# Patient Record
Sex: FEMALE | Race: WHITE | NOT HISPANIC OR LATINO | Employment: OTHER | URBAN - METROPOLITAN AREA
[De-identification: names, ages, dates, MRNs, and addresses within clinical notes are randomized per-mention and may not be internally consistent; named-entity substitution may affect disease eponyms.]

---

## 2023-11-20 DIAGNOSIS — I10 ESSENTIAL HYPERTENSION, BENIGN: ICD-10-CM

## 2023-12-05 PROBLEM — I25.10 CAD IN NATIVE ARTERY: Status: ACTIVE | Noted: 2023-12-05

## 2023-12-05 PROBLEM — R00.2 PALPITATIONS: Status: ACTIVE | Noted: 2023-12-05

## 2023-12-05 PROBLEM — G47.30 SLEEP APNEA: Status: ACTIVE | Noted: 2023-12-05

## 2023-12-05 PROBLEM — E78.2 MIXED HYPERLIPIDEMIA: Status: ACTIVE | Noted: 2023-12-05

## 2023-12-05 PROBLEM — E11.9 TYPE 2 DIABETES MELLITUS (MULTI): Status: ACTIVE | Noted: 2023-12-05

## 2023-12-05 PROBLEM — E87.6 HYPOKALEMIA: Status: ACTIVE | Noted: 2023-12-05

## 2023-12-05 PROBLEM — R09.89 BRUIT: Status: ACTIVE | Noted: 2023-12-05

## 2023-12-05 PROBLEM — I10 ESSENTIAL HYPERTENSION, BENIGN: Status: ACTIVE | Noted: 2023-12-05

## 2023-12-05 RX ORDER — LEVOTHYROXINE SODIUM 25 UG/1
TABLET ORAL
COMMUNITY

## 2023-12-05 RX ORDER — PSYLLIUM HUSK 0.4 G
CAPSULE ORAL
COMMUNITY

## 2023-12-05 RX ORDER — ATORVASTATIN CALCIUM 40 MG/1
40 TABLET, FILM COATED ORAL DAILY
COMMUNITY
End: 2024-03-15 | Stop reason: SDUPTHER

## 2023-12-05 RX ORDER — LANCETS 30 GAUGE
EACH MISCELLANEOUS
COMMUNITY
Start: 2023-08-25

## 2023-12-05 RX ORDER — GLIMEPIRIDE 1 MG/1
0.5 TABLET ORAL
COMMUNITY

## 2023-12-05 RX ORDER — BLOOD-GLUCOSE METER
EACH MISCELLANEOUS
COMMUNITY
Start: 2023-08-25

## 2023-12-05 RX ORDER — LISINOPRIL AND HYDROCHLOROTHIAZIDE 10; 12.5 MG/1; MG/1
TABLET ORAL
COMMUNITY
End: 2024-03-15 | Stop reason: WASHOUT

## 2023-12-05 RX ORDER — NAPROXEN SODIUM 220 MG/1
81 TABLET, FILM COATED ORAL DAILY
COMMUNITY

## 2023-12-05 RX ORDER — NITROGLYCERIN 0.4 MG/1
0.4 TABLET SUBLINGUAL EVERY 5 MIN PRN
COMMUNITY

## 2023-12-05 RX ORDER — MELOXICAM 7.5 MG/1
TABLET ORAL
COMMUNITY

## 2023-12-05 RX ORDER — LANCETS 33 GAUGE
EACH MISCELLANEOUS
COMMUNITY
Start: 2023-08-25

## 2023-12-06 RX ORDER — LISINOPRIL AND HYDROCHLOROTHIAZIDE 10; 12.5 MG/1; MG/1
TABLET ORAL
Qty: 135 TABLET | Refills: 3 | Status: SHIPPED | OUTPATIENT
Start: 2023-12-06

## 2024-03-15 ENCOUNTER — OFFICE VISIT (OUTPATIENT)
Dept: CARDIOLOGY | Facility: CLINIC | Age: 89
End: 2024-03-15
Payer: MEDICARE

## 2024-03-15 VITALS
BODY MASS INDEX: 24.4 KG/M2 | WEIGHT: 124.3 LBS | HEIGHT: 60 IN | SYSTOLIC BLOOD PRESSURE: 116 MMHG | HEART RATE: 88 BPM | DIASTOLIC BLOOD PRESSURE: 72 MMHG

## 2024-03-15 DIAGNOSIS — I10 ESSENTIAL HYPERTENSION, BENIGN: ICD-10-CM

## 2024-03-15 DIAGNOSIS — R00.2 PALPITATIONS: ICD-10-CM

## 2024-03-15 DIAGNOSIS — Z78.9 NEVER SMOKED TOBACCO: ICD-10-CM

## 2024-03-15 DIAGNOSIS — I25.10 CAD IN NATIVE ARTERY: ICD-10-CM

## 2024-03-15 DIAGNOSIS — E11.9 TYPE 2 DIABETES MELLITUS WITHOUT COMPLICATION, WITHOUT LONG-TERM CURRENT USE OF INSULIN (MULTI): ICD-10-CM

## 2024-03-15 DIAGNOSIS — E78.2 MIXED HYPERLIPIDEMIA: ICD-10-CM

## 2024-03-15 PROCEDURE — 1159F MED LIST DOCD IN RCRD: CPT | Performed by: INTERNAL MEDICINE

## 2024-03-15 PROCEDURE — 1036F TOBACCO NON-USER: CPT | Performed by: INTERNAL MEDICINE

## 2024-03-15 PROCEDURE — 3078F DIAST BP <80 MM HG: CPT | Performed by: INTERNAL MEDICINE

## 2024-03-15 PROCEDURE — 99213 OFFICE O/P EST LOW 20 MIN: CPT | Performed by: INTERNAL MEDICINE

## 2024-03-15 PROCEDURE — 3074F SYST BP LT 130 MM HG: CPT | Performed by: INTERNAL MEDICINE

## 2024-03-15 RX ORDER — ATORVASTATIN CALCIUM 40 MG/1
40 TABLET, FILM COATED ORAL DAILY
Qty: 90 TABLET | Refills: 3 | Status: SHIPPED | OUTPATIENT
Start: 2024-03-15

## 2024-03-15 NOTE — PATIENT INSTRUCTIONS
Follow up office visit in 1 year.    Continue same medications/treatment.  Patient educated on proper medication use.  Patient educated on risk factor modification.  Please bring any lab results from other providers / physicians to your next appointment.    Please bring all medicines, vitamins and herbal supplements with you when you come to the office.    Prescriptions will not be filled unless you are compliant with your follow up appointments or have a follow up  appointment scheduled as per instruction of your physician.  Refills should be requested at the time of  Your visit.    Joyce AGUILERA LPN, am scribing for and in the presence of  Dr. Delphine Preciado MD, FACC

## 2024-03-15 NOTE — PROGRESS NOTES
Referred by Dr. Claudio ref. provider found provider found for   Chief Complaint   Patient presents with    Follow-up     9 month follow up        History of Present Illness  Michelle Sánchez is a 89 y.o. year old female patient with history of hypertension, doing well from a cardiac standpoint no complaint no symptoms of chest pain or shortness of breath.  Has been taking her medication with no difficulty.  Apparently scheduled for blood work by her primary care physician at the [facility].  She has been ambulating with no difficulty.  She is extremely oriented and independent and very functional for her age.  I discussed with the patient in length we will continue medication.  Her main issue at this point is back problem and back pain because of her chronic back arthritis.  Patient will continue medication and mild monitor blood pressure and report back to me.  She will see me back in 1 year    Past Medical History  No past medical history on file.    Social History  Social History     Tobacco Use    Smoking status: Never    Smokeless tobacco: Never   Substance Use Topics    Alcohol use: Yes     Comment: 2-3x a year    Drug use: Never       Family History     Family History   Problem Relation Name Age of Onset    Coronary artery disease Mother      Alcohol abuse Father         Review of Systems  As per HPI, all other systems reviewed and negative.    Allergies:  No Known Allergies     Outpatient Medications:  Current Outpatient Medications   Medication Instructions    aspirin 81 mg, oral, Daily    atorvastatin (LIPITOR) 40 mg, oral, Daily, as directed    calcium carbonate/vitamin D3 (CALCIUM 500 + D ORAL) 1 tablet, oral, Daily    glimepiride (Amaryl) 1 mg tablet 0.5 tablets, oral, Daily before breakfast    levothyroxine (Synthroid, Levoxyl) 25 mcg tablet TAKE 1 TABLET BY MOUTH DAILY MONDAY-FRIDAY AND 1 AND 1/2 TABLET SATURDAY AND SUNDAY    lisinopriL-hydrochlorothiazide 10-12.5 mg tablet TAKE 1 TABLET IN THE AM.  IF BP IS LOW IN THE AFTERNOON DO NOT TAKE THE SECOND DOSAGE    meloxicam (Mobic) 7.5 mg tablet TAKE 1 TABLET ONCE DAILY AS NEEDED DO NOT TAKE WITH OTHER NSAIDS (MOTRIN, ADVIL, ALEVE, IBUPROFEN)    nitroglycerin (NITROSTAT) 0.4 mg, sublingual, Every 5 min PRN    OneTouch Delica Plus Lancet 30 gauge misc USE ONE LANCET TWICE WEEKLY    OneTouch Verio Flex meter misc USE AS DIRECTED    OneTouch Verio test strips strip TEST TWICE A WEEK    psyllium (MetamuciL) 0.4 gram capsule oral         Vitals:  Vitals:    03/15/24 1256   BP: 116/72   Pulse: 88       Physical Exam:  Physical Exam  Vitals and nursing note reviewed.   Constitutional:       Appearance: Normal appearance.   HENT:      Head: Normocephalic.   Eyes:      Pupils: Pupils are equal, round, and reactive to light.   Cardiovascular:      Rate and Rhythm: Normal rate and regular rhythm.      Pulses: Normal pulses.      Heart sounds: Normal heart sounds.   Pulmonary:      Effort: Pulmonary effort is normal.      Breath sounds: Normal breath sounds.   Musculoskeletal:         General: Normal range of motion.      Cervical back: Normal range of motion.   Skin:     General: Skin is dry.   Neurological:      General: No focal deficit present.      Mental Status: She is alert and oriented to person, place, and time.   Psychiatric:         Behavior: Behavior is cooperative.             Assessment/Plan   Diagnoses and all orders for this visit:  CAD in native artery  Essential hypertension, benign  Mixed hyperlipidemia  Palpitations  Type 2 diabetes mellitus without complication, without long-term current use of insulin (CMS/[facility])  BMI 24.0-24.9, adult  Never smoked tobacco          Delphine Preciado MD [facility]  Interventional Cardiology   of [facility]     Thank you for allowing me to participate in the care of this patient. Please do not hesitate to contact me with any further questions or concerns.

## 2025-01-28 DIAGNOSIS — I10 ESSENTIAL HYPERTENSION, BENIGN: ICD-10-CM

## 2025-01-28 RX ORDER — LISINOPRIL AND HYDROCHLOROTHIAZIDE 10; 12.5 MG/1; MG/1
TABLET ORAL
Qty: 135 TABLET | Refills: 1 | Status: SHIPPED | OUTPATIENT
Start: 2025-01-28

## 2025-04-08 ENCOUNTER — APPOINTMENT (OUTPATIENT)
Dept: CARDIOLOGY | Facility: CLINIC | Age: OVER 89
End: 2025-04-08
Payer: MEDICARE

## 2025-04-08 VITALS
WEIGHT: 127.1 LBS | DIASTOLIC BLOOD PRESSURE: 74 MMHG | SYSTOLIC BLOOD PRESSURE: 130 MMHG | BODY MASS INDEX: 25.62 KG/M2 | HEIGHT: 59 IN | HEART RATE: 64 BPM

## 2025-04-08 DIAGNOSIS — R00.2 PALPITATIONS: ICD-10-CM

## 2025-04-08 DIAGNOSIS — I25.10 CAD IN NATIVE ARTERY: ICD-10-CM

## 2025-04-08 DIAGNOSIS — Z78.9 NEVER SMOKED TOBACCO: ICD-10-CM

## 2025-04-08 DIAGNOSIS — E78.2 MIXED HYPERLIPIDEMIA: ICD-10-CM

## 2025-04-08 DIAGNOSIS — E11.9 TYPE 2 DIABETES MELLITUS WITHOUT COMPLICATION, WITHOUT LONG-TERM CURRENT USE OF INSULIN: ICD-10-CM

## 2025-04-08 DIAGNOSIS — I10 ESSENTIAL HYPERTENSION, BENIGN: ICD-10-CM

## 2025-04-08 PROCEDURE — 99214 OFFICE O/P EST MOD 30 MIN: CPT | Performed by: INTERNAL MEDICINE

## 2025-04-08 PROCEDURE — 1159F MED LIST DOCD IN RCRD: CPT | Performed by: INTERNAL MEDICINE

## 2025-04-08 PROCEDURE — 1036F TOBACCO NON-USER: CPT | Performed by: INTERNAL MEDICINE

## 2025-04-08 PROCEDURE — 3078F DIAST BP <80 MM HG: CPT | Performed by: INTERNAL MEDICINE

## 2025-04-08 PROCEDURE — 3075F SYST BP GE 130 - 139MM HG: CPT | Performed by: INTERNAL MEDICINE

## 2025-04-08 RX ORDER — LISINOPRIL AND HYDROCHLOROTHIAZIDE 10; 12.5 MG/1; MG/1
1 TABLET ORAL DAILY
Start: 2025-04-08 | End: 2026-04-08

## 2025-04-08 RX ORDER — GABAPENTIN 300 MG/1
300 CAPSULE ORAL 3 TIMES DAILY
COMMUNITY

## 2025-04-08 NOTE — PROGRESS NOTES
Referred by Dr. Claudio ref. provider found provider found for   Chief Complaint   Patient presents with    Follow-up     Patient here for 1 year surveillance follow up for ongoing management of Coronary Artery Disease and Hypertension.         Chief complaint:   Chief Complaint   Patient presents with    Follow-up     Patient here for 1 year surveillance follow up for ongoing management of Coronary Artery Disease and Hypertension.         History of Present Illness  Michelle Sánchez is a 89 y.o. year old female patient for follow-up.  Blood pressure is adequately controlled.  She has been taking her antihypertensive medication with no difficulties.  Denies any symptoms of palpitations syncope or presyncope.  I clarified lisinopril hydrochlorothiazide order to be twice a day.  Did not have any issue with medication.  Discussed with the patient we will continue medication will call for any problem and follow-up as scheduled    Past Medical History  Past Medical History:   Diagnosis Date    Coronary artery disease     Diabetes mellitus (Multi)     Hyperlipidemia     Hypertension        Social History  Social History     Tobacco Use    Smoking status: Never    Smokeless tobacco: Never   Substance Use Topics    Alcohol use: Not Currently     Comment: 2-3x a year    Drug use: Never       Family History     Family History   Problem Relation Name Age of Onset    Coronary artery disease Mother      Alcohol abuse Father         Review of Systems  As per HPI, all other systems reviewed and negative.    Allergies:  No Known Allergies     Outpatient Medications:  Current Outpatient Medications   Medication Instructions    aspirin 81 mg, Daily    atorvastatin (LIPITOR) 40 mg, oral, Daily, as directed    calcium carbonate/vitamin D3 (CALCIUM 500 + D ORAL) 1 tablet, Daily    gabapentin (NEURONTIN) 300 mg, 3 times daily    glimepiride (Amaryl) 1 mg tablet 0.5 tablets, Daily before breakfast    levothyroxine (Synthroid, Levoxyl) 25 mcg  tablet TAKE 1 TABLET BY MOUTH DAILY MONDAY-FRIDAY AND 1 AND 1/2 TABLET SATURDAY AND SUNDAY    lisinopriL-hydrochlorothiazide 10-12.5 mg tablet 1 tablet, oral, 2 times daily, If SBP is below 140 in evening then hold evening dose.    meloxicam (Mobic) 7.5 mg tablet TAKE 1 TABLET ONCE DAILY AS NEEDED DO NOT TAKE WITH OTHER NSAIDS (MOTRIN, ADVIL, ALEVE, IBUPROFEN)    nitroglycerin (NITROSTAT) 0.4 mg, Every 5 min PRN    OneTouch Delica Plus Lancet 30 gauge misc USE ONE LANCET TWICE WEEKLY    OneTouch Verio Flex meter misc USE AS DIRECTED    OneTouch Verio test strips strip TEST TWICE A WEEK    psyllium (MetamuciL) 0.4 gram capsule Take by mouth.         Vitals:  Vitals:    04/08/25 1159   BP: 130/74   Pulse: 64       Physical Exam:  Physical Exam  Vitals and nursing note reviewed.   Constitutional:       Appearance: Normal appearance.   HENT:      Head: Normocephalic.   Eyes:      Pupils: Pupils are equal, round, and reactive to light.   Cardiovascular:      Rate and Rhythm: Normal rate and regular rhythm.      Pulses: Normal pulses.      Heart sounds: Normal heart sounds.   Pulmonary:      Effort: Pulmonary effort is normal.      Breath sounds: Normal breath sounds.   Musculoskeletal:         General: Normal range of motion.      Cervical back: Normal range of motion.   Skin:     General: Skin is dry.   Neurological:      General: No focal deficit present.      Mental Status: She is alert and oriented to person, place, and time.   Psychiatric:         Behavior: Behavior is cooperative.             Assessment/Plan   Diagnoses and all orders for this visit:  Essential hypertension, benign  CAD in native artery  Mixed hyperlipidemia  Palpitations  Type 2 diabetes mellitus without complication, without long-term current use of insulin  BMI 26.0-26.9,adult  Never smoked tobacco      Joyce AGUILERA LPN am scribing for, and in the presence of Delphine Preciado M.D..    Delphine AGUILERA M.D., personally performed the services  described in the documentation as scribed by Joyce Ma LPN   in my presence, and confirm it is both accurate and complete.      Delphine Prceiado MD [facility]  Interventional Cardiology  Thank you for allowing me to participate in the care of this patient. Please do not hesitate to contact me with any further questions or concerns.      Unable to assess due to patient's cognitive impairment

## 2025-06-04 DIAGNOSIS — E78.2 MIXED HYPERLIPIDEMIA: ICD-10-CM

## 2025-06-04 RX ORDER — ATORVASTATIN CALCIUM 40 MG/1
40 TABLET, FILM COATED ORAL DAILY
Qty: 90 TABLET | Refills: 3 | Status: SHIPPED | OUTPATIENT
Start: 2025-06-04 | End: 2026-06-04

## 2025-06-04 NOTE — TELEPHONE ENCOUNTER
Received request for prescription refills for patient.   Patient follows with Dr. Preciado    Request is for atorvastatin  Is patient currently on medication yes    Last OV 4/8/2025  Next OV 4/7/2026    Pended for signing and sent to provider

## 2026-04-07 ENCOUNTER — APPOINTMENT (OUTPATIENT)
Dept: CARDIOLOGY | Facility: CLINIC | Age: OVER 89
End: 2026-04-07
Payer: MEDICARE